# Patient Record
Sex: FEMALE | URBAN - METROPOLITAN AREA
[De-identification: names, ages, dates, MRNs, and addresses within clinical notes are randomized per-mention and may not be internally consistent; named-entity substitution may affect disease eponyms.]

---

## 2020-11-09 ENCOUNTER — OFFICE VISIT (OUTPATIENT)
Dept: URBAN - METROPOLITAN AREA CLINIC 87 | Facility: CLINIC | Age: 85
End: 2020-11-09
Payer: MEDICARE

## 2020-11-09 DIAGNOSIS — H35.3211 EXDTVE AGE-REL MCLR DEGN, RIGHT EYE, WITH ACTV CHRDL NEOVAS: Primary | ICD-10-CM

## 2020-11-09 DIAGNOSIS — H35.3122 NEXDTVE AGE-RELATED MCLR DEGN, LEFT EYE, INTERMED DRY STAGE: ICD-10-CM

## 2020-11-09 PROCEDURE — 92014 COMPRE OPH EXAM EST PT 1/>: CPT | Performed by: OPHTHALMOLOGY

## 2020-11-09 PROCEDURE — 92134 CPTRZ OPH DX IMG PST SGM RTA: CPT | Performed by: OPHTHALMOLOGY

## 2020-11-09 ASSESSMENT — INTRAOCULAR PRESSURE
OD: 18
OS: 19

## 2020-11-09 NOTE — IMPRESSION/PLAN
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 Right.
-history injections in past

OCT:
OD: RPEchanges OS: RPE changes Plan: Rec observation today. 

RTC 6 months OCT reeval Avastin

## 2020-11-09 NOTE — IMPRESSION/PLAN
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 Plan: Stable upon examination. The patient was advised to continue ARED's. Smoking cessation was advised. The patient was also advised to continue to monitor AG and VA and call immediately with any changes.

## 2021-12-10 ENCOUNTER — OFFICE VISIT (OUTPATIENT)
Dept: URBAN - METROPOLITAN AREA CLINIC 87 | Facility: CLINIC | Age: 86
End: 2021-12-10
Payer: MEDICARE

## 2021-12-10 PROCEDURE — 99213 OFFICE O/P EST LOW 20 MIN: CPT | Performed by: OPHTHALMOLOGY

## 2021-12-10 PROCEDURE — 92134 CPTRZ OPH DX IMG PST SGM RTA: CPT | Performed by: OPHTHALMOLOGY

## 2021-12-10 ASSESSMENT — INTRAOCULAR PRESSURE
OS: 13
OD: 14

## 2021-12-10 NOTE — IMPRESSION/PLAN
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 Right.
-history injections in past

OCT:
OD: RPE changes OS: RPE changes Plan: The patient's neovascular AMD remains inactive OU upon examination and OCT. Additional treatment was not recommended at this time. The patient was advised to continue ARED's. The patient was also advised to monitor AG and VA closely, and call immediately with any changes OU. 

RTC 1 year DFE OU OCT OU

## 2022-06-10 ENCOUNTER — OFFICE VISIT (OUTPATIENT)
Dept: URBAN - METROPOLITAN AREA CLINIC 87 | Facility: CLINIC | Age: 87
End: 2022-06-10
Payer: MEDICARE

## 2022-06-10 DIAGNOSIS — H35.3122 NEXDTVE AGE-RELATED MCLR DEGN, LEFT EYE, INTERMED DRY STAGE: ICD-10-CM

## 2022-06-10 DIAGNOSIS — H35.3211 EXDTVE AGE-REL MCLR DEGN, RIGHT EYE, WITH ACTV CHRDL NEOVAS: Primary | ICD-10-CM

## 2022-06-10 PROCEDURE — 92134 CPTRZ OPH DX IMG PST SGM RTA: CPT | Performed by: OPHTHALMOLOGY

## 2022-06-10 PROCEDURE — 99214 OFFICE O/P EST MOD 30 MIN: CPT | Performed by: OPHTHALMOLOGY

## 2022-06-10 ASSESSMENT — INTRAOCULAR PRESSURE
OS: 14
OD: 16

## 2022-06-10 NOTE — IMPRESSION/PLAN
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 Right.
-history injections in past

OCT: 06/10/22 OD: RPE changes OS: RPE changes Plan: The patient's neovascular AMD remains inactive OU upon examination and OCT. Additional treatment was not recommended at this time. The patient was advised to continue ARED's. The patient was also advised to monitor AG and VA closely, and call immediately with any changes OU. Refer to [facility] RTC 1 year DFE OU OCT OU